# Patient Record
Sex: MALE | Race: BLACK OR AFRICAN AMERICAN | Employment: OTHER | URBAN - METROPOLITAN AREA
[De-identification: names, ages, dates, MRNs, and addresses within clinical notes are randomized per-mention and may not be internally consistent; named-entity substitution may affect disease eponyms.]

---

## 2018-08-24 PROBLEM — R41.0 DELIRIUM: Status: ACTIVE | Noted: 2018-08-24

## 2018-10-29 ENCOUNTER — HOSPITAL ENCOUNTER (INPATIENT)
Age: 63
LOS: 3 days | Discharge: HOME HEALTH | DRG: 065 | End: 2018-11-01
Attending: EMERGENCY MEDICINE | Admitting: INTERNAL MEDICINE
Payer: MEDICARE

## 2018-10-29 ENCOUNTER — APPOINTMENT (OUTPATIENT)
Dept: CT IMAGING | Age: 63
DRG: 065 | End: 2018-10-29
Payer: MEDICARE

## 2018-10-29 ENCOUNTER — APPOINTMENT (OUTPATIENT)
Dept: GENERAL RADIOLOGY | Age: 63
DRG: 065 | End: 2018-10-29
Payer: MEDICARE

## 2018-10-29 DIAGNOSIS — R29.898 RIGHT ARM WEAKNESS: ICD-10-CM

## 2018-10-29 DIAGNOSIS — G45.9 TIA (TRANSIENT ISCHEMIC ATTACK): Primary | ICD-10-CM

## 2018-10-29 LAB
A/G RATIO: 1.4 (ref 1.1–2.2)
ALBUMIN SERPL-MCNC: 3.9 G/DL (ref 3.4–5)
ALP BLD-CCNC: 66 U/L (ref 40–129)
ALT SERPL-CCNC: 15 U/L (ref 10–40)
ANION GAP SERPL CALCULATED.3IONS-SCNC: 15 MMOL/L (ref 3–16)
APTT: 28.6 SEC (ref 26–36)
AST SERPL-CCNC: 20 U/L (ref 15–37)
BASOPHILS ABSOLUTE: 0 K/UL (ref 0–0.2)
BASOPHILS RELATIVE PERCENT: 0.3 %
BILIRUB SERPL-MCNC: 0.4 MG/DL (ref 0–1)
BUN BLDV-MCNC: 7 MG/DL (ref 7–20)
CALCIUM SERPL-MCNC: 8.8 MG/DL (ref 8.3–10.6)
CHLORIDE BLD-SCNC: 101 MMOL/L (ref 99–110)
CO2: 22 MMOL/L (ref 21–32)
CREAT SERPL-MCNC: 0.7 MG/DL (ref 0.8–1.3)
EOSINOPHILS ABSOLUTE: 0.1 K/UL (ref 0–0.6)
EOSINOPHILS RELATIVE PERCENT: 0.6 %
GFR AFRICAN AMERICAN: >60
GFR NON-AFRICAN AMERICAN: >60
GLOBULIN: 2.8 G/DL
GLUCOSE BLD-MCNC: 109 MG/DL (ref 70–99)
GLUCOSE BLD-MCNC: 98 MG/DL (ref 70–99)
HCT VFR BLD CALC: 37.5 % (ref 40.5–52.5)
HEMATOLOGY PATH CONSULT: NO
HEMOGLOBIN: 12.3 G/DL (ref 13.5–17.5)
INR BLD: 0.98 (ref 0.86–1.14)
LYMPHOCYTES ABSOLUTE: 2.4 K/UL (ref 1–5.1)
LYMPHOCYTES RELATIVE PERCENT: 28.3 %
MCH RBC QN AUTO: 22.4 PG (ref 26–34)
MCHC RBC AUTO-ENTMCNC: 32.9 G/DL (ref 31–36)
MCV RBC AUTO: 68.1 FL (ref 80–100)
MONOCYTES ABSOLUTE: 1.1 K/UL (ref 0–1.3)
MONOCYTES RELATIVE PERCENT: 13.5 %
NEUTROPHILS ABSOLUTE: 4.8 K/UL (ref 1.7–7.7)
NEUTROPHILS RELATIVE PERCENT: 57.3 %
PDW BLD-RTO: 14 % (ref 12.4–15.4)
PERFORMED ON: ABNORMAL
PLATELET # BLD: 207 K/UL (ref 135–450)
PMV BLD AUTO: 7.9 FL (ref 5–10.5)
POTASSIUM SERPL-SCNC: 3.4 MMOL/L (ref 3.5–5.1)
PROTHROMBIN TIME: 11.2 SEC (ref 9.8–13)
RBC # BLD: 5.51 M/UL (ref 4.2–5.9)
SODIUM BLD-SCNC: 138 MMOL/L (ref 136–145)
TOTAL PROTEIN: 6.7 G/DL (ref 6.4–8.2)
TROPONIN: <0.01 NG/ML
WBC # BLD: 8.3 K/UL (ref 4–11)

## 2018-10-29 PROCEDURE — 70498 CT ANGIOGRAPHY NECK: CPT

## 2018-10-29 PROCEDURE — 6370000000 HC RX 637 (ALT 250 FOR IP): Performed by: INTERNAL MEDICINE

## 2018-10-29 PROCEDURE — 70450 CT HEAD/BRAIN W/O DYE: CPT

## 2018-10-29 PROCEDURE — 85610 PROTHROMBIN TIME: CPT

## 2018-10-29 PROCEDURE — 93005 ELECTROCARDIOGRAM TRACING: CPT | Performed by: PHYSICIAN ASSISTANT

## 2018-10-29 PROCEDURE — 2060000000 HC ICU INTERMEDIATE R&B

## 2018-10-29 PROCEDURE — 99285 EMERGENCY DEPT VISIT HI MDM: CPT

## 2018-10-29 PROCEDURE — 84484 ASSAY OF TROPONIN QUANT: CPT

## 2018-10-29 PROCEDURE — 2580000003 HC RX 258: Performed by: INTERNAL MEDICINE

## 2018-10-29 PROCEDURE — 71046 X-RAY EXAM CHEST 2 VIEWS: CPT

## 2018-10-29 PROCEDURE — 70496 CT ANGIOGRAPHY HEAD: CPT

## 2018-10-29 PROCEDURE — 94760 N-INVAS EAR/PLS OXIMETRY 1: CPT

## 2018-10-29 PROCEDURE — 85730 THROMBOPLASTIN TIME PARTIAL: CPT

## 2018-10-29 PROCEDURE — 85025 COMPLETE CBC W/AUTO DIFF WBC: CPT

## 2018-10-29 PROCEDURE — 36415 COLL VENOUS BLD VENIPUNCTURE: CPT

## 2018-10-29 PROCEDURE — 80053 COMPREHEN METABOLIC PANEL: CPT

## 2018-10-29 PROCEDURE — 6360000004 HC RX CONTRAST MEDICATION: Performed by: EMERGENCY MEDICINE

## 2018-10-29 RX ORDER — DOCUSATE SODIUM 100 MG/1
100 CAPSULE, LIQUID FILLED ORAL 2 TIMES DAILY
Status: DISCONTINUED | OUTPATIENT
Start: 2018-10-29 | End: 2018-11-01 | Stop reason: HOSPADM

## 2018-10-29 RX ORDER — ASPIRIN 81 MG/1
81 TABLET ORAL ONCE
Status: COMPLETED | OUTPATIENT
Start: 2018-10-29 | End: 2018-10-29

## 2018-10-29 RX ORDER — ONDANSETRON 2 MG/ML
4 INJECTION INTRAMUSCULAR; INTRAVENOUS EVERY 6 HOURS PRN
Status: DISCONTINUED | OUTPATIENT
Start: 2018-10-29 | End: 2018-11-01 | Stop reason: HOSPADM

## 2018-10-29 RX ORDER — PRAVASTATIN SODIUM 20 MG
20 TABLET ORAL NIGHTLY
Status: DISCONTINUED | OUTPATIENT
Start: 2018-10-29 | End: 2018-11-01 | Stop reason: HOSPADM

## 2018-10-29 RX ORDER — SODIUM CHLORIDE 0.9 % (FLUSH) 0.9 %
10 SYRINGE (ML) INJECTION EVERY 12 HOURS SCHEDULED
Status: DISCONTINUED | OUTPATIENT
Start: 2018-10-29 | End: 2018-11-01 | Stop reason: HOSPADM

## 2018-10-29 RX ORDER — PRAVASTATIN SODIUM 20 MG
20 TABLET ORAL DAILY
COMMUNITY

## 2018-10-29 RX ORDER — IBUPROFEN 600 MG/1
600 TABLET ORAL 2 TIMES DAILY
COMMUNITY

## 2018-10-29 RX ORDER — SODIUM CHLORIDE 0.9 % (FLUSH) 0.9 %
10 SYRINGE (ML) INJECTION PRN
Status: DISCONTINUED | OUTPATIENT
Start: 2018-10-29 | End: 2018-11-01 | Stop reason: HOSPADM

## 2018-10-29 RX ORDER — FAMOTIDINE 20 MG/1
20 TABLET, FILM COATED ORAL 2 TIMES DAILY
Status: DISCONTINUED | OUTPATIENT
Start: 2018-10-29 | End: 2018-11-01 | Stop reason: HOSPADM

## 2018-10-29 RX ORDER — LISINOPRIL AND HYDROCHLOROTHIAZIDE 20; 12.5 MG/1; MG/1
1 TABLET ORAL DAILY
Status: DISCONTINUED | OUTPATIENT
Start: 2018-10-30 | End: 2018-11-01 | Stop reason: HOSPADM

## 2018-10-29 RX ADMIN — METOPROLOL TARTRATE 50 MG: 25 TABLET ORAL at 22:18

## 2018-10-29 RX ADMIN — ASPIRIN 81 MG: 81 TABLET ORAL at 22:28

## 2018-10-29 RX ADMIN — IOPAMIDOL 75 ML: 755 INJECTION, SOLUTION INTRAVENOUS at 13:18

## 2018-10-29 RX ADMIN — Medication 10 ML: at 22:20

## 2018-10-29 RX ADMIN — PRAVASTATIN SODIUM 20 MG: 20 TABLET ORAL at 22:19

## 2018-10-29 RX ADMIN — FAMOTIDINE 20 MG: 20 TABLET ORAL at 22:19

## 2018-10-29 ASSESSMENT — ENCOUNTER SYMPTOMS
CHOKING: 0
EYE DISCHARGE: 0
VOMITING: 0
ABDOMINAL PAIN: 0
NAUSEA: 0
APNEA: 0
BACK PAIN: 0
FACIAL SWELLING: 0
EYE REDNESS: 0
SHORTNESS OF BREATH: 0

## 2018-10-29 NOTE — ED PROVIDER NOTES
maintained without evidence of an acute infarct. There is no evidence of hydrocephalus. Mild bifrontal and biparietal periventricular and subcortical white matter hypoattenuation is noted. ORBITS: The visualized portion of the orbits demonstrate no acute abnormality. SINUSES: The visualized paranasal sinuses and mastoid air cells demonstrate no acute abnormality. SOFT TISSUES/SKULL:  No acute abnormality of the visualized skull or soft tissues. Calcifications involving bilateral carotid vasculature reflect calcific atherosclerosis. No acute intracranial abnormality. White matter hypoattenuation described is typical of microvascular ischemic disease or as sequela of dysmyelinating/demyelinating processes. Cta Neck W Wo Contrast    Result Date: 10/29/2018  EXAMINATION: CTA OF THE HEAD WITH CONTRAST; CTA OF THE NECK 10/29/2018 1:18 pm TECHNIQUE: CTA of the head/brain and neck was performed with the administration of intravenous contrast.  Multiplanar reformatted images are provided for review. MIP images are provided for review. Stenosis of the internal carotid arteries measured using NASCET criteria. Dose modulation, iterative reconstruction, and/or weight based adjustment of the mA/kV was utilized to reduce the radiation dose to as low as reasonably achievable. COMPARISON: Noncontrast CT head performed earlier the same day. HISTORY: ORDERING SYSTEM PROVIDED HISTORY: Right upper extremity weakness TECHNOLOGIST PROVIDED HISTORY: Has a \"code stroke\" or \"stroke alert\" been called? ->No Ordering Physician Provided Reason for Exam: Extremity Weakness (Pt reports right arm weakness that started yesterday at 1pm) Acuity: Acute Type of Exam: Subsequent/Follow-up; ORDERING SYSTEM PROVIDED HISTORY: Right upper extremity weakness TECHNOLOGIST PROVIDED HISTORY: Ordering Physician Provided Reason for Exam: Extremity Weakness (Pt reports right arm weakness that started yesterday at 1pm) Acuity: Acute Type of Exam:

## 2018-10-30 ENCOUNTER — APPOINTMENT (OUTPATIENT)
Dept: MRI IMAGING | Age: 63
DRG: 065 | End: 2018-10-30
Payer: MEDICARE

## 2018-10-30 LAB
BASOPHILS ABSOLUTE: 0.1 K/UL (ref 0–0.2)
BASOPHILS RELATIVE PERCENT: 0.8 %
EOSINOPHILS ABSOLUTE: 0.1 K/UL (ref 0–0.6)
EOSINOPHILS RELATIVE PERCENT: 1.1 %
ESTIMATED AVERAGE GLUCOSE: 122.6 MG/DL
HBA1C MFR BLD: 5.9 %
HCT VFR BLD CALC: 37.7 % (ref 40.5–52.5)
HEMATOLOGY PATH CONSULT: NO
HEMOGLOBIN: 12.4 G/DL (ref 13.5–17.5)
LV EF: 55 %
LVEF MODALITY: NORMAL
LYMPHOCYTES ABSOLUTE: 2.4 K/UL (ref 1–5.1)
LYMPHOCYTES RELATIVE PERCENT: 31.6 %
MCH RBC QN AUTO: 22.3 PG (ref 26–34)
MCHC RBC AUTO-ENTMCNC: 32.9 G/DL (ref 31–36)
MCV RBC AUTO: 67.8 FL (ref 80–100)
MONOCYTES ABSOLUTE: 1.1 K/UL (ref 0–1.3)
MONOCYTES RELATIVE PERCENT: 13.8 %
NEUTROPHILS ABSOLUTE: 4 K/UL (ref 1.7–7.7)
NEUTROPHILS RELATIVE PERCENT: 52.7 %
PDW BLD-RTO: 13.8 % (ref 12.4–15.4)
PLATELET # BLD: 223 K/UL (ref 135–450)
PMV BLD AUTO: 8.4 FL (ref 5–10.5)
RBC # BLD: 5.56 M/UL (ref 4.2–5.9)
WBC # BLD: 7.7 K/UL (ref 4–11)

## 2018-10-30 PROCEDURE — 2580000003 HC RX 258: Performed by: INTERNAL MEDICINE

## 2018-10-30 PROCEDURE — G8988 SELF CARE GOAL STATUS: HCPCS

## 2018-10-30 PROCEDURE — 6360000002 HC RX W HCPCS: Performed by: INTERNAL MEDICINE

## 2018-10-30 PROCEDURE — G8978 MOBILITY CURRENT STATUS: HCPCS

## 2018-10-30 PROCEDURE — 83036 HEMOGLOBIN GLYCOSYLATED A1C: CPT

## 2018-10-30 PROCEDURE — G8987 SELF CARE CURRENT STATUS: HCPCS

## 2018-10-30 PROCEDURE — 6370000000 HC RX 637 (ALT 250 FOR IP): Performed by: INTERNAL MEDICINE

## 2018-10-30 PROCEDURE — 94664 DEMO&/EVAL PT USE INHALER: CPT

## 2018-10-30 PROCEDURE — 97167 OT EVAL HIGH COMPLEX 60 MIN: CPT

## 2018-10-30 PROCEDURE — 36415 COLL VENOUS BLD VENIPUNCTURE: CPT

## 2018-10-30 PROCEDURE — 99223 1ST HOSP IP/OBS HIGH 75: CPT | Performed by: INTERNAL MEDICINE

## 2018-10-30 PROCEDURE — 94760 N-INVAS EAR/PLS OXIMETRY 1: CPT

## 2018-10-30 PROCEDURE — 85025 COMPLETE CBC W/AUTO DIFF WBC: CPT

## 2018-10-30 PROCEDURE — 97530 THERAPEUTIC ACTIVITIES: CPT

## 2018-10-30 PROCEDURE — 1200000000 HC SEMI PRIVATE

## 2018-10-30 PROCEDURE — 97161 PT EVAL LOW COMPLEX 20 MIN: CPT

## 2018-10-30 PROCEDURE — 70551 MRI BRAIN STEM W/O DYE: CPT

## 2018-10-30 PROCEDURE — 97535 SELF CARE MNGMENT TRAINING: CPT

## 2018-10-30 PROCEDURE — G8979 MOBILITY GOAL STATUS: HCPCS

## 2018-10-30 PROCEDURE — 93306 TTE W/DOPPLER COMPLETE: CPT

## 2018-10-30 RX ADMIN — FAMOTIDINE 20 MG: 20 TABLET ORAL at 10:03

## 2018-10-30 RX ADMIN — Medication 10 ML: at 21:17

## 2018-10-30 RX ADMIN — DOCUSATE SODIUM 100 MG: 100 CAPSULE, LIQUID FILLED ORAL at 10:03

## 2018-10-30 RX ADMIN — LISINOPRIL AND HYDROCHLOROTHIAZIDE 1 TABLET: 12.5; 2 TABLET ORAL at 10:03

## 2018-10-30 RX ADMIN — METOPROLOL TARTRATE 50 MG: 25 TABLET ORAL at 21:16

## 2018-10-30 RX ADMIN — ENOXAPARIN SODIUM 40 MG: 40 INJECTION SUBCUTANEOUS at 10:04

## 2018-10-30 RX ADMIN — DOCUSATE SODIUM 100 MG: 100 CAPSULE, LIQUID FILLED ORAL at 21:16

## 2018-10-30 RX ADMIN — METOPROLOL TARTRATE 50 MG: 25 TABLET ORAL at 10:03

## 2018-10-30 RX ADMIN — PRAVASTATIN SODIUM 20 MG: 20 TABLET ORAL at 21:16

## 2018-10-30 RX ADMIN — Medication 10 ML: at 10:04

## 2018-10-30 RX ADMIN — FAMOTIDINE 20 MG: 20 TABLET ORAL at 21:16

## 2018-10-30 ASSESSMENT — PAIN SCALES - GENERAL
PAINLEVEL_OUTOF10: 0
PAINLEVEL_OUTOF10: 0

## 2018-10-30 NOTE — PROGRESS NOTES
Occupational Therapy   Occupational Therapy Initial Assessment  Date: 10/30/2018   Patient Name: Jules Vale  MRN: [id number]     : 1955    Date of Service: 10/30/2018  Assessment: Pt is 57 yo male admitted w/ R side weakness , pt MRI + for L pontine infarct-. Pt was essentially independent PTA and lived alone . He has congenital deformity of B hands but has managed functionally w/o assist. Pt is well below his PLOF d/t  significant R side weakness - particularly R LE. Pt not safe to return home alone. Will need intensive therapies prior to retrurn home alone. Pt not aware of his diagnosis yet to understand need for therapies. OT will follow on acute care to address goals and D/C options. Discharge Recommendations:  Patient would benefit from continued therapy after discharge, 5-7 sessions per week Sherry Kiser scored a 16/24 on the AM-PAC ADL Inpatient form. Current research shows that an AM-PAC score of 17 or less is typically not associated with a discharge to the patient's home setting. Based on the patients AM-PAC score and their current ADL deficits, it is recommended that the patient have 5-7 sessions per week of Occupational Therapy at d/c to increase the patients independence. OT Equipment Recommendations  Equipment Needed:  (TBD)      Patient Diagnosis(es): The primary encounter diagnosis was TIA (transient ischemic attack). A diagnosis of Right arm weakness was also pertinent to this visit. has a past medical history of Hypertension. has no past surgical history on file. Restrictions  Restrictions/Precautions  Restrictions/Precautions: Fall Risk    Subjective   General  Chart Reviewed: Yes  Patient assessed for rehabilitation services?: Yes  Additional Pertinent Hx: Per Ana Lama, ALEJANDRA - CNP, 10/30, \"The patient says that Saturday around [place], he began getting a funny feeling in his right arm and noticed some weakness as well.   He says that he was having some trouble extension  RUE AROM (degrees)  RUE AROM : WFL  LUE Strength  Gross LUE Strength: WFL  LUE Strength Comment: 3+/5 generally ( LE weak also)  RUE Strength  RUE Strength Comment: 4+/5 generally  Coordination functional for basic self care - but R side notably weaker    Assessment   Performance deficits / Impairments: Decreased functional mobility ; Decreased ADL status; Decreased strength;Decreased safe awareness;Decreased balance;Decreased endurance;Decreased high-level IADLs;Decreased coordination;Decreased cognition  Assessment: Pt is 57 yo male admitted w/ R side weakness , pt MRI + for L pontine infarct-. Pt was essentially independent PTA and lived alone . He has congenital deformity of B hands but has managed functionally w/o assist. Pt is well below his PLOF d/t  significant R side weakness - particularly R LE. Pt not safe to return home alone. Will need intensive therapies prior to retrurn home alone. Pt not aware of his diagnosis yet to understand need for therapies. OT will follow on acute care to address goals and D/C options. Prognosis: Good  Decision Making: High Complexity  History: see chart  Exam: 8 deficit areas  Assistance / Modification: all adls and mob  Patient Education: Ed pt on role of OT,POC and need for ongoing therapty. Pt does not recognize his issues with balance. Barriers to Learning: some slower cog processing, does best with more simple  concepts needs some repitition but eventually processes instructions. REQUIRES OT FOLLOW UP: Yes  Activity Tolerance  Activity Tolerance: Patient Tolerated treatment well  Safety Devices  Safety Devices in place: Yes  Type of devices: Left in chair;Nurse notified;Call light within reach; Chair alarm in place         Plan   Plan  Times per week: 3-5  Current Treatment Recommendations: Strengthening, Balance Training, Functional Mobility Training, Endurance Training, Safety Education & Training, Self-Care / ADL, Neuromuscular Re-education,

## 2018-10-30 NOTE — PROGRESS NOTES
Physical Therapy    Facility/Department: [facility], [place] 5W PROGRESSIVE CARE  Initial Assessment/Treatment Session  This note serves as D/C summary if patient is discharged prior to next treatment session. Assessment: Pt is a 58 y.o. M. admitted 10/29 for R-sided weakness; treated for CVA. He presents with mildly decreased RUE/RLE strength, and decreased safety awareness, experiencing several LOB while ambulating with/without AD support, requiring Min A to correct. PT noted decreased R knee control, and decreased clearance R foot during swing phase. He would benefit from continued therapy to improve his RLE strength, balance, safety awareness, and independence ambulating. Recommend high-frequency therapy prior to returning home. Radha Kiser scored a 17/24 on the AM-PAC short mobility form. Current research shows that an AM-PAC score of 17 or less is typically not associated with a discharge to the patient's home setting. Based on the patients AM-PAC score and their current functional mobility deficits, it is recommended that the patient have 5-7 sessions per week of Physical Therapy at d/c to increase the patients independence. NAME: Christian Gambino  : 1955  MRN: [id number]    Date of Service: 10/30/2018    Discharge Recommendations:  Patient would benefit from continued therapy after discharge   PT Equipment Recommendations  Other: Will continue to assess    Patient Diagnosis(es): The primary encounter diagnosis was TIA (transient ischemic attack). A diagnosis of Right arm weakness was also pertinent to this visit. has a past medical history of Hypertension. has no past surgical history on file. Restrictions  Restrictions/Precautions  Restrictions/Precautions: Fall Risk     Vision/Hearing  Vision: Within Functional Limits  Hearing: Within functional limits       Subjective  General  Chart Reviewed:  Yes  Additional Pertinent Hx: Per ALEJANDRA Monique CNP, 10/30, \"The patient says that Saturday

## 2018-10-30 NOTE — CONSULTS
Prescriptions Prior to Admission:   ibuprofen (ADVIL;MOTRIN) 600 MG tablet, Take 600 mg by mouth 2 times daily  pravastatin (PRAVACHOL) 20 MG tablet, Take 20 mg by mouth daily  Cholecalciferol 2000 units TABS, Take 2,000 Units by mouth 2 times daily  metoprolol tartrate (LOPRESSOR) 50 MG tablet, Take 1 tablet by mouth 2 times daily  potassium chloride (KLOR-CON M) 20 MEQ extended release tablet, Take 1 tablet by mouth daily  lisinopril-hydrochlorothiazide (PRINZIDE;ZESTORETIC) 20-12.5 MG per tablet, Take 1 tablet by mouth daily. No Known Allergies    Family History   Problem Relation Age of Onset    Heart Disease Mother     Asthma Father     Asthma Sister      History   Smoking Status    Current Every Day Smoker    Packs/day: 0.50   Smokeless Tobacco    Never Used     History   Drug use: Unknown     History   Alcohol Use    4.8 oz/week    8 Cans of beer per week     Comment: 2 40oz malt liquor beers every other day     ROS:  Constitutional- No weight loss or fevers  Eyes- No diplopia. No photophobia. Ears/nose/throat- No dysphagia. No Dysarthria  Cardiovascular- No palpitations. No chest pain  Respiratory- No dyspnea. No Cough  Gastrointestinal- No Abdominal pain. No Vomiting. Genitourinary- No incontinence. No urinary retention  Musculoskeletal- No myalgia. No arthralgia  Skin- No rash. No easy bruising. Psychiatric- No depression. No anxiety  Endocrine- No diabetes. No thyroid issues. Hematologic- No bleeding difficulty. No fatigue  Neurologic- + weakness. No Headache. Exam:  Blood pressure (!) 142/83, pulse 76, temperature 97.9 °F (36.6 °C), resp. rate 18, height 5' 5\" (1.651 m), weight 116 lb 13.5 oz (53 kg), SpO2 97 %. Constitutional    Vital signs: BP, HR, and RR reviewed   General alert, no distress, well-nourished  Eyes: unable to visualize the fundi  Cardiovascular: pulses symmetric in all 4 extremities. No peripheral edema.   Psychiatric: cooperative with examination, no psychotic

## 2018-10-31 LAB
CHOLESTEROL, TOTAL: 183 MG/DL (ref 0–199)
HDLC SERPL-MCNC: 45 MG/DL (ref 40–60)
LDL CHOLESTEROL CALCULATED: 119 MG/DL
TRIGL SERPL-MCNC: 97 MG/DL (ref 0–150)
VLDLC SERPL CALC-MCNC: 19 MG/DL

## 2018-10-31 PROCEDURE — 36415 COLL VENOUS BLD VENIPUNCTURE: CPT

## 2018-10-31 PROCEDURE — 97535 SELF CARE MNGMENT TRAINING: CPT

## 2018-10-31 PROCEDURE — 97530 THERAPEUTIC ACTIVITIES: CPT

## 2018-10-31 PROCEDURE — 94760 N-INVAS EAR/PLS OXIMETRY 1: CPT

## 2018-10-31 PROCEDURE — 6360000002 HC RX W HCPCS: Performed by: INTERNAL MEDICINE

## 2018-10-31 PROCEDURE — 2580000003 HC RX 258: Performed by: INTERNAL MEDICINE

## 2018-10-31 PROCEDURE — 80061 LIPID PANEL: CPT

## 2018-10-31 PROCEDURE — 93010 ELECTROCARDIOGRAM REPORT: CPT | Performed by: INTERNAL MEDICINE

## 2018-10-31 PROCEDURE — 6370000000 HC RX 637 (ALT 250 FOR IP): Performed by: INTERNAL MEDICINE

## 2018-10-31 PROCEDURE — 99232 SBSQ HOSP IP/OBS MODERATE 35: CPT | Performed by: INTERNAL MEDICINE

## 2018-10-31 PROCEDURE — 1200000000 HC SEMI PRIVATE

## 2018-10-31 RX ADMIN — FAMOTIDINE 20 MG: 20 TABLET ORAL at 08:35

## 2018-10-31 RX ADMIN — DOCUSATE SODIUM 100 MG: 100 CAPSULE, LIQUID FILLED ORAL at 08:35

## 2018-10-31 RX ADMIN — Medication 10 ML: at 08:35

## 2018-10-31 RX ADMIN — FAMOTIDINE 20 MG: 20 TABLET ORAL at 22:29

## 2018-10-31 RX ADMIN — LISINOPRIL AND HYDROCHLOROTHIAZIDE 1 TABLET: 12.5; 2 TABLET ORAL at 08:35

## 2018-10-31 RX ADMIN — PRAVASTATIN SODIUM 20 MG: 20 TABLET ORAL at 22:28

## 2018-10-31 RX ADMIN — METOPROLOL TARTRATE 50 MG: 25 TABLET ORAL at 08:35

## 2018-10-31 RX ADMIN — Medication 10 ML: at 22:29

## 2018-10-31 RX ADMIN — ENOXAPARIN SODIUM 40 MG: 40 INJECTION SUBCUTANEOUS at 08:35

## 2018-10-31 RX ADMIN — METOPROLOL TARTRATE 50 MG: 25 TABLET ORAL at 22:28

## 2018-10-31 ASSESSMENT — PAIN SCALES - GENERAL
PAINLEVEL_OUTOF10: 0

## 2018-10-31 NOTE — PROGRESS NOTES
Hospitalist Progress Note  10/31/2018 1:29 PM  Subjective:   Admit Date: 10/29/2018  PCP: Jl Araujo MD  Interval History: pt feels better  Denies any other complaints  Chart reviewed. Diet: DIET GENERAL; Dental Soft  Medications:   Scheduled Meds:   lisinopril-hydrochlorothiazide  1 tablet Oral Daily    metoprolol tartrate  50 mg Oral BID    pravastatin  20 mg Oral Nightly    sodium chloride flush  10 mL Intravenous 2 times per day    docusate sodium  100 mg Oral BID    enoxaparin  40 mg Subcutaneous Daily    famotidine  20 mg Oral BID     Continuous Infusions:  CBC:   Recent Labs      10/29/18   1201  10/30/18   0448   WBC  8.3  7.7   HGB  12.3*  12.4*   PLT  207  223     BMP:    Recent Labs      10/29/18   1201   NA  138   K  3.4*   CL  101   CO2  22   BUN  7   CREATININE  0.7*   GLUCOSE  98     Hepatic:   Recent Labs      10/29/18   1201   AST  20   ALT  15   BILITOT  0.4   ALKPHOS  66     Troponin:   Recent Labs      10/29/18   1201   TROPONINI  <0.01     BNP: No results for input(s): BNP in the last 72 hours. Lipids:   Recent Labs      10/31/18   0522   CHOL  183   HDL  45     INR:   Recent Labs      10/29/18   1201   INR  0.98       Objective:   Vitals: /64   Pulse 76   Temp 98 °F (36.7 °C) (Oral)   Resp 18   Ht 5' 5\" (1.651 m)   Wt 118 lb 9.7 oz (53.8 kg)   SpO2 93%   BMI 19.74 kg/m²   General appearance: alert,awake,oriented x 3 and cooperative with exam  HEENT: Head: Normal, normocephalic, atraumatic, anicteric, pupils are reactive to light. Dry mucous membrane.   Neck: no adenopathy, no carotid bruit, supple, symmetrical, trachea midline and thyroid not enlarged, symmetric, no tenderness/mass/nodules  Lungs: clear to auscultation bilaterally  Heart: regular rate and rhythm, S1, S2 normal, no murmur, click, rub or gallop  Abdomen: soft, non-tender; bowel sounds normal; no masses,  no organomegaly  Extremities: extremities normal, atraumatic, no cyanosis or edema  Neurologic: Mental status: Alert, oriented, thought content appropriate  Mild R sided weakness    Assessment and Plan:   1. cva- awaiting placement decision    Patient Active Problem List:     Delirium     Altered mental status     Leukocytosis     Bronchitis     Acidosis     Hypertension     High cholesterol     TIA (transient ischemic attack)     Right arm weakness    Pt is stable. Ready for the discharge  Discussed with staff and pt about the plan. See the orders for further plan. Will proceed further acc to pt's progress.   Time spent with management of the pt is-20 minutes      Electronically signed by: Joaquín Ellington MD, on 10/31/2018, at 1:29 PM

## 2018-10-31 NOTE — H&P
[address] [place] [address]                              HISTORY AND PHYSICAL    PATIENT NAME: SUSAN ROB                       :        1955  MED REC NO:   [id number]                          ROOM:       5126  ACCOUNT NO:   [de-identified]                           ADMIT DATE: 10/29/2018  PROVIDER:     Yuniel Peña MD    HISTORY OF PRESENT ILLNESS:  He is a 51-year-old black male who came in  with right-sided weakness and he was found to have acute CVA in the  right patrick and started on aspirin. He was getting physical therapy. This morning when I saw the patient, he is alert and awake. He  complains of weakness in the right arm but no chest pain, abdominal  pain, nausea, vomiting, diarrhea. PAST MEDICAL HISTORY:  The patient has a history of hypertension,  degenerative joint disease. PERSONAL HISTORY:  History of smoking. No history of alcoholism. No  history of drug abuse. REVIEW OF SYSTEMS:  Joint pains, limitation of range of motion,  arthritis present. FAMILY HISTORY:  Not significant. PHYSICAL EXAMINATION:  GENERAL:  He is alert, awake, oriented x3. VITAL SIGNS:  Afebrile. Vitals are stable. LUNGS:  Clear. HEART:  S1, S2. No murmurs. No gallop. No JVD. No bruits. ABDOMEN:  Soft. Bowel sounds present. Nontender. No organomegaly. No  hernias. No guarding. No rigidity. EXTREMITIES:  No pedal edema. HEENT:  Anicteric conjunctivae. Pupils are reactive to light. No  thyromegaly. No lymph nodes. No JVD. LABORATORY DATA:  As in the chart. ADMITTING DIAGNOSES:  Severe hypertension and degenerative joint  disease. PLAN:  Medicines are all reviewed. Orders are done. Discussed with the  staff. Neurology consult appreciated. We will proceed further  according to patient's progress. Possible discharge back to home in one  or two days.         Godfrey Cano MD    D:

## 2018-10-31 NOTE — PROGRESS NOTES
friend)  Referring Practitioner: Darol Najjar  Diagnosis: R side weakness and sensory loss, TIA- (MRI indicates Acute L pontine Infarct  Subjective  Subjective: Pt seen BS, in bed. Pt agreeable to OT for ADL's. Pt without complaints. General Comment  Comments:  Pt has congenital deformities in his hands and has some delayed cog processing - able to understad basic directions and concepts with some repitition. Orientation  Orientation  Overall Orientation Status: Within Functional Limits  Objective    ADL  Grooming: Setup;Stand by assistance;Contact guard assistance (seated for washing face; stance for rinsing with mouthwash, at sink)  UE Bathing: Setup;Stand by assistance (seated at sink)  LE Bathing: Setup;Minimal assistance  UE Dressing:  (monitor gown changed with assist for ties)  LE Dressing: Setup;Minimal assistance;Verbal cueing (to don briefs, hospital pants and footies)  Toileting: Unable to assess(comment) (declined need)  Additional Comments: pt sponge bathed from chair at sink.  Pt used R UE without difficulty        Standing Balance  Time: 2-3 min  Activity: stance for ADL's at sink-slightly unsteady without LOB  Functional Mobility  Functional - Mobility Device: Rolling Walker  Activity: To/from bathroom  Assist Level: Contact guard assistance  Functional Mobility Comments: cues for safety with RW use-tends to leave it or set it aside  Wheelchair Bed Transfers  Wheelchair/Bed - Technique: Ambulating  Equipment Used: Bed (chair with arms, recliner)  Level of Asssistance: Contact guard assistance;Stand by assistance  Wheelchair Transfers Comments: RW; cues for hand placement  Bed mobility  Supine to Sit: Supervision  Sit to Supine: Unable to assess (up to chair)            Cognition  Overall Cognitive Status: WFL  Cognition Comment: Pt has congenital deformities in his hands and has some delayed cog processing issues as well - able to understand basic concepts and directions very well with some Therapy Time   Individual Concurrent Group Co-treatment   Time In 1115         Time Out 1155         Minutes 40                 Nely SEXTON/L,515  This note to serve as discharge summary if pt d/c'd prior to next session

## 2018-11-01 VITALS
RESPIRATION RATE: 18 BRPM | SYSTOLIC BLOOD PRESSURE: 135 MMHG | OXYGEN SATURATION: 99 % | DIASTOLIC BLOOD PRESSURE: 89 MMHG | TEMPERATURE: 97.6 F | WEIGHT: 119.05 LBS | HEIGHT: 65 IN | BODY MASS INDEX: 19.83 KG/M2 | HEART RATE: 96 BPM

## 2018-11-01 PROCEDURE — 97110 THERAPEUTIC EXERCISES: CPT

## 2018-11-01 PROCEDURE — 2580000003 HC RX 258: Performed by: INTERNAL MEDICINE

## 2018-11-01 PROCEDURE — 97110 THERAPEUTIC EXERCISES: CPT | Performed by: PHYSICIAN ASSISTANT

## 2018-11-01 PROCEDURE — 6360000002 HC RX W HCPCS: Performed by: INTERNAL MEDICINE

## 2018-11-01 PROCEDURE — 99238 HOSP IP/OBS DSCHRG MGMT 30/<: CPT | Performed by: INTERNAL MEDICINE

## 2018-11-01 PROCEDURE — 6370000000 HC RX 637 (ALT 250 FOR IP): Performed by: INTERNAL MEDICINE

## 2018-11-01 PROCEDURE — 97116 GAIT TRAINING THERAPY: CPT

## 2018-11-01 PROCEDURE — 94760 N-INVAS EAR/PLS OXIMETRY 1: CPT

## 2018-11-01 RX ORDER — ASPIRIN 81 MG/1
81 TABLET ORAL DAILY
Qty: 30 TABLET | Refills: 3 | Status: SHIPPED | OUTPATIENT
Start: 2018-11-01

## 2018-11-01 RX ORDER — FAMOTIDINE 20 MG/1
20 TABLET, FILM COATED ORAL 2 TIMES DAILY
Qty: 60 TABLET | Refills: 3 | Status: SHIPPED | OUTPATIENT
Start: 2018-11-01

## 2018-11-01 RX ADMIN — Medication 10 ML: at 10:45

## 2018-11-01 RX ADMIN — ENOXAPARIN SODIUM 40 MG: 40 INJECTION SUBCUTANEOUS at 10:45

## 2018-11-01 RX ADMIN — METOPROLOL TARTRATE 50 MG: 25 TABLET ORAL at 10:45

## 2018-11-01 RX ADMIN — DOCUSATE SODIUM 100 MG: 100 CAPSULE, LIQUID FILLED ORAL at 10:45

## 2018-11-01 RX ADMIN — LISINOPRIL AND HYDROCHLOROTHIAZIDE 1 TABLET: 12.5; 2 TABLET ORAL at 10:44

## 2018-11-01 RX ADMIN — FAMOTIDINE 20 MG: 20 TABLET ORAL at 10:44

## 2018-11-01 NOTE — PROGRESS NOTES
training, Neuromuscular Re-education, Home Exercise Program, Safety Education & Training, Patient/Caregiver Education & Training, Equipment Evaluation, Education, & procurement, Positioning, Modalities, Manual Therapy - Soft Tissue Mobilization  Safety Devices  Type of devices: All fall risk precautions in place, Call light within reach, Chair alarm in place, Gait belt, Left in chair  Restraints  Initially in place: No     Therapy Time   Individual Concurrent Group Co-treatment   Time In 1005         Time Out 1030         Minutes 25         Timed Code Treatment Minutes: 25 Minutes     If Pt. Is d/c'd prior to next session, this will serve as d/c summary.   [place], [place], [phone]

## 2018-11-01 NOTE — CARE COORDINATION
[facility]  Received referral from case management [id number] [place] [address]. with patent regarding [facility] services, Faxed orders to [address] care to see patient by   11/3/18  Boo Aquino LPN    [facility] CTN Cell [phone], Fax [phone]

## 2018-11-01 NOTE — PROGRESS NOTES
concepts with some repitition. Orientation     Objective                                           Cognition  Overall Cognitive Status: [facility]  Cognition Comment: Pt needs some increased time, and redirection. Type of ROM/Therapeutic Exercise  Comment: Issued red tputty and pt was instructed in gripping ex. Pt worked on removing items from putty with his R hand with mild difficulty. Manipulation of small items with R hand with mild to moderate difficulty. Pt's friend brought in large metal clips for pt to work on squeezing and he performed this with minimal difficulty. Assessment   Performance deficits / Impairments: Decreased functional mobility ; Decreased ADL status; Decreased strength;Decreased safe awareness;Decreased balance;Decreased endurance;Decreased high-level IADLs;Decreased coordination;Decreased cognition;Decreased fine motor control  Assessment: Pt tolerated session well. Pt required up to [address] for ADL's (LB); SBA/CGA for fx'l transfers and CGA for fx'l mob with cues for safety with AD use. Pt would benefit from continued OT to maximize function prior to returning home. Cont with POC. Prognosis: Good  Patient Education: Safety  REQUIRES OT FOLLOW UP: Yes  Activity Tolerance  Activity Tolerance: Patient Tolerated treatment well  Safety Devices  Safety Devices in place: Yes  Type of devices: Left in chair;Nurse notified;Call light within reach; Chair alarm in place;Gait belt          Plan   Plan  Times per week: 3-5  Times per day: Daily  Plan weeks: 1-2 weeks  Current Treatment Recommendations: Strengthening, Balance Training, Functional Mobility Training, Endurance Training, Safety Education & Training, Self-Care / ADL, Neuromuscular Re-education, Patient/Caregiver Education & Training, Equipment Evaluation, Education, & procurement  G-Code     OutComes Score                                           AM-PAC Score             Goals  Short term

## 2018-11-01 NOTE — CARE COORDINATION
Sw intern spoke with pt. Pt prefers to go home with home care. Sw intern gave home care list to pt and he will look over with his fiance later today when she is here to visit. Otto instructed pt to call us with his preference. Otto provided pt with IMM letter. Electronically signed by Kristopher Martinez on 11/1/2018 at 1:58 PM    Otto spoke with patient and two family members present in room regarding dc and home care. They are agreeable to [address]. Family member Delfina Krishnan ([phone]) [place] and will be assisting him. She would like to be called when [facility] comes to the home. Otto informed [facility] liaison of above.      Electronically signed by Ryanne Palma on 11/1/2018 at 3:14 PM

## 2018-11-01 NOTE — CARE COORDINATION
Have faxed clinical information to insurance to see if they would approve acute rehab. Will reply when decision known.

## 2018-11-02 LAB
EKG ATRIAL RATE: 101 BPM
EKG DIAGNOSIS: NORMAL
EKG P AXIS: 54 DEGREES
EKG P-R INTERVAL: 130 MS
EKG Q-T INTERVAL: 344 MS
EKG QRS DURATION: 82 MS
EKG QTC CALCULATION (BAZETT): 446 MS
EKG R AXIS: 19 DEGREES
EKG T AXIS: 61 DEGREES
EKG VENTRICULAR RATE: 101 BPM

## 2019-04-06 ENCOUNTER — HOSPITAL ENCOUNTER (EMERGENCY)
Age: 64
Discharge: HOME | End: 2019-04-06
Attending: EMERGENCY MEDICINE
Payer: MEDICARE

## 2019-04-06 ENCOUNTER — APPOINTMENT (OUTPATIENT)
Dept: GENERAL RADIOLOGY | Age: 64
End: 2019-04-06
Payer: MEDICARE

## 2019-04-06 VITALS
OXYGEN SATURATION: 99 % | TEMPERATURE: 97.7 F | WEIGHT: 132.28 LBS | DIASTOLIC BLOOD PRESSURE: 87 MMHG | HEART RATE: 76 BPM | SYSTOLIC BLOOD PRESSURE: 123 MMHG | RESPIRATION RATE: 19 BRPM | BODY MASS INDEX: 21.26 KG/M2 | HEIGHT: 66 IN

## 2019-04-06 DIAGNOSIS — R07.9 CHEST PAIN, UNSPECIFIED TYPE: Primary | ICD-10-CM

## 2019-04-06 LAB
A/G RATIO: 1.4 (ref 1.1–2.2)
ALBUMIN SERPL-MCNC: 4.8 G/DL (ref 3.4–5)
ALP BLD-CCNC: 81 U/L (ref 40–129)
ALT SERPL-CCNC: 18 U/L (ref 10–40)
ANION GAP SERPL CALCULATED.3IONS-SCNC: 16 MMOL/L (ref 3–16)
AST SERPL-CCNC: 25 U/L (ref 15–37)
BASOPHILS ABSOLUTE: 0.1 K/UL (ref 0–0.2)
BASOPHILS RELATIVE PERCENT: 1.3 %
BILIRUB SERPL-MCNC: <0.2 MG/DL (ref 0–1)
BUN BLDV-MCNC: 12 MG/DL (ref 7–20)
CALCIUM SERPL-MCNC: 9.7 MG/DL (ref 8.3–10.6)
CHLORIDE BLD-SCNC: 100 MMOL/L (ref 99–110)
CO2: 24 MMOL/L (ref 21–32)
CREAT SERPL-MCNC: 0.9 MG/DL (ref 0.8–1.3)
EOSINOPHILS ABSOLUTE: 0.2 K/UL (ref 0–0.6)
EOSINOPHILS RELATIVE PERCENT: 2 %
GFR AFRICAN AMERICAN: >60
GFR NON-AFRICAN AMERICAN: >60
GLOBULIN: 3.4 G/DL
GLUCOSE BLD-MCNC: 90 MG/DL (ref 70–99)
HCT VFR BLD CALC: 42.4 % (ref 40.5–52.5)
HEMATOLOGY PATH CONSULT: NO
HEMOGLOBIN: 14.1 G/DL (ref 13.5–17.5)
LIPASE: 33 U/L (ref 13–60)
LYMPHOCYTES ABSOLUTE: 3.1 K/UL (ref 1–5.1)
LYMPHOCYTES RELATIVE PERCENT: 41.2 %
MCH RBC QN AUTO: 22.4 PG (ref 26–34)
MCHC RBC AUTO-ENTMCNC: 33.2 G/DL (ref 31–36)
MCV RBC AUTO: 67.4 FL (ref 80–100)
MONOCYTES ABSOLUTE: 0.8 K/UL (ref 0–1.3)
MONOCYTES RELATIVE PERCENT: 10.4 %
NEUTROPHILS ABSOLUTE: 3.4 K/UL (ref 1.7–7.7)
NEUTROPHILS RELATIVE PERCENT: 45.1 %
PDW BLD-RTO: 14.8 % (ref 12.4–15.4)
PLATELET # BLD: 203 K/UL (ref 135–450)
PMV BLD AUTO: 8.8 FL (ref 5–10.5)
POTASSIUM SERPL-SCNC: 3.9 MMOL/L (ref 3.5–5.1)
RBC # BLD: 6.29 M/UL (ref 4.2–5.9)
SODIUM BLD-SCNC: 140 MMOL/L (ref 136–145)
TOTAL PROTEIN: 8.2 G/DL (ref 6.4–8.2)
TROPONIN: <0.01 NG/ML
WBC # BLD: 7.6 K/UL (ref 4–11)

## 2019-04-06 PROCEDURE — 93005 ELECTROCARDIOGRAM TRACING: CPT | Performed by: PHYSICIAN ASSISTANT

## 2019-04-06 PROCEDURE — 71046 X-RAY EXAM CHEST 2 VIEWS: CPT

## 2019-04-06 PROCEDURE — 99285 EMERGENCY DEPT VISIT HI MDM: CPT

## 2019-04-06 PROCEDURE — 84484 ASSAY OF TROPONIN QUANT: CPT

## 2019-04-06 PROCEDURE — 83690 ASSAY OF LIPASE: CPT

## 2019-04-06 PROCEDURE — 80053 COMPREHEN METABOLIC PANEL: CPT

## 2019-04-06 PROCEDURE — 6370000000 HC RX 637 (ALT 250 FOR IP): Performed by: PHYSICIAN ASSISTANT

## 2019-04-06 PROCEDURE — 85025 COMPLETE CBC W/AUTO DIFF WBC: CPT

## 2019-04-06 RX ORDER — ASPIRIN 81 MG/1
244 TABLET, CHEWABLE ORAL ONCE
Status: COMPLETED | OUTPATIENT
Start: 2019-04-06 | End: 2019-04-06

## 2019-04-06 RX ADMIN — ASPIRIN 81 MG 244 MG: 81 TABLET ORAL at 18:48

## 2019-04-06 ASSESSMENT — HEART SCORE: ECG: 1

## 2019-04-06 ASSESSMENT — ENCOUNTER SYMPTOMS
COLOR CHANGE: 0
SHORTNESS OF BREATH: 0
BACK PAIN: 0
COUGH: 0
VOMITING: 0
ABDOMINAL PAIN: 0

## 2019-04-07 LAB
EKG ATRIAL RATE: 92 BPM
EKG DIAGNOSIS: NORMAL
EKG P AXIS: 76 DEGREES
EKG P-R INTERVAL: 150 MS
EKG Q-T INTERVAL: 334 MS
EKG QRS DURATION: 82 MS
EKG QTC CALCULATION (BAZETT): 413 MS
EKG R AXIS: 16 DEGREES
EKG T AXIS: 24 DEGREES
EKG VENTRICULAR RATE: 92 BPM

## 2019-04-07 PROCEDURE — 93010 ELECTROCARDIOGRAM REPORT: CPT | Performed by: INTERNAL MEDICINE

## 2019-04-07 NOTE — ED PROVIDER NOTES
[facility]  eMERGENCY dEPARTMENT eNCOUnter      Pt Name: Shabbir Clark  MRN: [id number]  [place] 1955  Date of evaluation: 4/6/2019  Provider: Mark Jaramillo, [facility] [place]       Chief Complaint   Patient presents with    Chest Pain     mid sternal cp this am. lasted 20 minutes. took rolaids which helped. no pain at present. HISTORY OF PRESENT ILLNESS  (Location/Symptom, Timing/Onset, Context/Setting, Quality, Duration, Modifying Factors, Severity.)   Mg Kiser is a 61 y.o. male who presents to the emergency department complaining of chest pain. Patient had an episode this morning lasting approximately 20 minutes. He complains of mid chest pain that was relieved with Rolaids. No pain since then. He states his family called 911 this evening. He has history of hypertension and hyperlipidemia. He quit smoking about 6 months ago. He received a baby aspirin today but otherwise has not taken anything for the symptoms. No nausea or vomiting. No abdominal or back pain. No prior history of coronary artery disease. No trauma or injury. Nursing Notes were reviewed and I agree. REVIEW OF SYSTEMS    (2-9 systems for level 4, 10 or more for level 5)     Review of Systems   Constitutional: Negative for fever. Respiratory: Negative for cough and shortness of breath. Cardiovascular: Positive for chest pain. Negative for leg swelling. Gastrointestinal: Negative for abdominal pain and vomiting. Musculoskeletal: Negative for back pain. Skin: Negative for color change, rash and wound. Neurological: Negative for weakness and numbness. Psychiatric/Behavioral: Negative for agitation, behavioral problems and confusion. Except as noted above the remainder of the review of systems was reviewed and negative. PAST MEDICAL HISTORY         Diagnosis Date    Hypertension        SURGICAL HISTORY     History reviewed.  No pertinent surgical light.   Neck: Normal range of motion. Cardiovascular: Normal rate, regular rhythm and normal heart sounds. Pulmonary/Chest: Effort normal. No respiratory distress. Abdominal: Soft. There is no tenderness. Musculoskeletal: Normal range of motion. He exhibits no edema. Neurological: He is alert and oriented to person, place, and time. No cranial nerve deficit. Skin: Skin is warm. Psychiatric: He has a normal mood and affect. His behavior is normal.   Nursing note and vitals reviewed. DIAGNOSTIC RESULTS     EKG: All EKG's are interpreted by [facility] in the absence of a cardiologist.    EKG interpreted by myself - please refer to attending physician's note for complete EKG interpretation:    Rhythm: sinus rhythm   Rate: nl  No evidence of acute ischemia or injury. RADIOLOGY:   Non-plain film images such as CT, Ultrasound and MRI are read by the radiologist. Plain radiographic images are visualized and preliminarily interpreted by [facility] with the below findings:    Reviewed radiologist's interpretation. Interpretation per the Radiologist below, if available at the time of this note:    XR CHEST STANDARD (2 VW)   Final Result   No acute cardiopulmonary disease.                LABS:  Labs Reviewed   CBC WITH AUTO DIFFERENTIAL - Abnormal; Notable for the following components:       Result Value    RBC 6.29 (*)     MCV 67.4 (*)     MCH 22.4 (*)     All other components within normal limits    Narrative:     Performed at:  [facility]  [address] [place], [place] [address]   Phone [phone]   COMPREHENSIVE METABOLIC PANEL    Narrative:     Performed at:  [facility]  [address] [place], [place] [address]   Phone [phone]   TROPONIN    Narrative:     Performed at:  [facility]  [address] [place], [place] [address]   Phone [phone]   LIPASE    Narrative: added on to earlier labs 18:24 reyla  Performed at:  [facility]  [address] [place], [place] [address]   Phone [phone]       All other labs were within normal range or not returned as of this dictation. EMERGENCY DEPARTMENT COURSE and DIFFERENTIAL DIAGNOSIS/MDM:   Vitals:    Vitals:    04/06/19 1710 04/06/19 1802 04/06/19 1900   BP: (!) 175/105 122/80 123/87   Pulse: 92 80 76   Resp: 16  19   Temp: 97.7 °F (36.5 °C)     TempSrc: Oral     SpO2: 97% 98% 99%   Weight: 132 lb 4.4 oz (60 kg)     Height: 5' 6\" (1.676 m)       I discussed with Sandra Rowley Fears and/or family the exam results, diagnosis, care, prognosis, reasons to return and the importance of follow up. Patient and/or family is in full agreement with plan and all questions have been answered. Specific discharge instructions explained, including reasons to return to the emergency department. Maggie Lisa is well appearing, non-toxic, and afebrile at the time of discharge. Patient had chest pain this morning. None currently. He has a heart or of 3. Troponin after chest pain has been resolved for over 4 hours is negative. Chest x-ray is clear. He does not want to stay in the hospital.  He understands to return for any new, worsening other concerns and follow up with primary care. His belly is soft. He is nontoxic well-appearing symptoms were entirely relieved with Rolaids. I estimate there is LOW risk for PULMONARY EMBOLISM, ACUTE CORONARY SYNDROME, OR THORACIC AORTIC DISSECTION, thus I consider the discharge disposition reasonable. This patient was seen by the attending physician and they agreed with the assessment and plan. CONSULTS:  None    PROCEDURES:  None    FINAL IMPRESSION      1.  Chest pain, unspecified type          DISPOSITION/PLAN   DISPOSITION Decision To Discharge 04/06/2019 07:03:47 PM      PATIENT REFERRED TO:  MD Tyrone Talbot [phone]    Call in 1 day  For follow up      DISCHARGE MEDICATIONS:  Discharge Medication List as of 4/6/2019  7:19 PM          (Please note that portions of this note were completed with a voice recognition program.  Efforts were made to edit the dictations but occasionally words are mis-transcribed.)    Deloris Albert[address]  04/06/19 7039